# Patient Record
Sex: MALE | Race: BLACK OR AFRICAN AMERICAN | NOT HISPANIC OR LATINO | URBAN - METROPOLITAN AREA
[De-identification: names, ages, dates, MRNs, and addresses within clinical notes are randomized per-mention and may not be internally consistent; named-entity substitution may affect disease eponyms.]

---
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
## 2023-04-03 ENCOUNTER — RX RENEWAL (OUTPATIENT)
Age: 18
End: 2023-04-03

## 2023-04-03 RX ORDER — CHOLECALCIFEROL (VITAMIN D3) 50 MCG
25 MCG TABLET ORAL
Qty: 90 | Refills: 0 | Status: ACTIVE | COMMUNITY
Start: 2022-02-22 | End: 1900-01-01

## 2023-04-07 ENCOUNTER — APPOINTMENT (OUTPATIENT)
Dept: PEDIATRIC NEUROLOGY | Facility: CLINIC | Age: 18
End: 2023-04-07
Payer: COMMERCIAL

## 2023-04-07 VITALS
WEIGHT: 207 LBS | SYSTOLIC BLOOD PRESSURE: 134 MMHG | HEIGHT: 72 IN | BODY MASS INDEX: 28.04 KG/M2 | DIASTOLIC BLOOD PRESSURE: 76 MMHG

## 2023-04-07 PROCEDURE — 99214 OFFICE O/P EST MOD 30 MIN: CPT

## 2023-04-08 NOTE — QUALITY MEASURES
[Seizure frequency] : Seizure frequency: Yes [Etiology, seizure type, and epilepsy syndrome] : Etiology, seizure type, and epilepsy syndrome: Yes [Side effects of anti-seizure medications] : Side effects of anti-seizure medications: Yes [Safety and education around seizures] : Safety and education around seizures: Yes [Issues around driving] : Issues around driving: Not Applicable [Screening for anxiety, depression] : Screening for anxiety, depression: Yes [Treatment-resistant epilepsy (every visit)] : Treatment-resistant epilepsy (every visit): Not Applicable [Counseling for women of childbearing potential with epilepsy (including folic acid supplement)] : Counseling for women of childbearing potential with epilepsy (including folic acid supplement): Not Applicable [Adherence to medication(s)] : Adherence to medication(s): Yes [Options for adjunctive therapy (Neurostimulation, CBD, Dietary Therapy, Epilepsy Surgery)] : Options for adjunctive therapy (Neurostimulation, CBD, Dietary Therapy, Epilepsy Surgery): Not Applicable [25 Hydroxy Vitamin D level assessed and Vitamin D3 ordered] : 25 Hydroxy Vitamin D level assessed and Vitamin D3 ordered: Yes [Thyroid profile ordered] : Thyroid profile ordered: Not Applicable

## 2023-04-08 NOTE — ASSESSMENT
[FreeTextEntry1] : Isolated myoclonic seizures but no bilateral tonic clonic seizures for approaching two years. \par \par Seizure diagnosis, prognosis, recurrence risk,  provocative factors, health risks, first aid and safety precautions were reviewed. \par \par Indications for ongoing pharmacological treatment of seizures, potential benefits of treatment and possible adverse effects of medication were carefully considered and discussed.

## 2023-04-08 NOTE — HISTORY OF PRESENT ILLNESS
[FreeTextEntry1] : 17 year boy with generalized epilepsy. His electroclinical syndrome seems most consistent with ANDREW.\par \par He has not had any bilateral tonic clonic seizures for approaching two years since starting the zonisamide. He still has isolated myoclonic jerks the frequency of which is not entirely clear, weekly to monthly? These are not disruptive in any way.\par \par Attending college, studying biochemistry, doing well.\par \par Sleeping well.\par \par Depressed mood and excessive anxiety were denied.

## 2023-04-10 LAB
25(OH)D3 SERPL-MCNC: 12.2 NG/ML
ALBUMIN SERPL ELPH-MCNC: 4.9 G/DL
ALP BLD-CCNC: 119 U/L
ALT SERPL-CCNC: 34 U/L
ANION GAP SERPL CALC-SCNC: 12 MMOL/L
AST SERPL-CCNC: 19 U/L
BASOPHILS # BLD AUTO: 0.03 K/UL
BASOPHILS NFR BLD AUTO: 0.8 %
BILIRUB SERPL-MCNC: 0.5 MG/DL
BUN SERPL-MCNC: 8 MG/DL
CALCIUM SERPL-MCNC: 10.3 MG/DL
CHLORIDE SERPL-SCNC: 109 MMOL/L
CO2 SERPL-SCNC: 22 MMOL/L
CREAT SERPL-MCNC: 1.24 MG/DL
EGFR: 86 ML/MIN/1.73M2
EOSINOPHIL # BLD AUTO: 0.15 K/UL
EOSINOPHIL NFR BLD AUTO: 3.8 %
HCT VFR BLD CALC: 50.2 %
HGB BLD-MCNC: 16.8 G/DL
IMM GRANULOCYTES NFR BLD AUTO: 0 %
LYMPHOCYTES # BLD AUTO: 1.72 K/UL
LYMPHOCYTES NFR BLD AUTO: 44 %
MAN DIFF?: NORMAL
MCHC RBC-ENTMCNC: 30.7 PG
MCHC RBC-ENTMCNC: 33.5 GM/DL
MCV RBC AUTO: 91.6 FL
MONOCYTES # BLD AUTO: 0.28 K/UL
MONOCYTES NFR BLD AUTO: 7.2 %
NEUTROPHILS # BLD AUTO: 1.73 K/UL
NEUTROPHILS NFR BLD AUTO: 44.2 %
PLATELET # BLD AUTO: 283 K/UL
POTASSIUM SERPL-SCNC: 4.3 MMOL/L
PROT SERPL-MCNC: 7.2 G/DL
RBC # BLD: 5.48 M/UL
RBC # FLD: 12.7 %
SODIUM SERPL-SCNC: 143 MMOL/L
WBC # FLD AUTO: 3.91 K/UL

## 2023-04-21 LAB — ZONISAMIDE SERPL-MCNC: 13.4 UG/ML

## 2023-07-05 ENCOUNTER — RX RENEWAL (OUTPATIENT)
Age: 18
End: 2023-07-05

## 2023-07-05 ENCOUNTER — APPOINTMENT (OUTPATIENT)
Dept: PEDIATRIC NEUROLOGY | Facility: CLINIC | Age: 18
End: 2023-07-05
Payer: COMMERCIAL

## 2023-07-05 ENCOUNTER — APPOINTMENT (OUTPATIENT)
Dept: PEDIATRIC NEUROLOGY | Facility: CLINIC | Age: 18
End: 2023-07-05

## 2023-07-05 PROCEDURE — 95816 EEG AWAKE AND DROWSY: CPT

## 2023-07-05 RX ORDER — RESVER/WINE/BFL/GRPSD/PC/C/POM 200MG-60MG
25 MCG CAPSULE ORAL
Qty: 90 | Refills: 0 | Status: ACTIVE | COMMUNITY
Start: 2023-07-05 | End: 1900-01-01

## 2023-07-13 ENCOUNTER — APPOINTMENT (OUTPATIENT)
Dept: PEDIATRIC NEUROLOGY | Facility: CLINIC | Age: 18
End: 2023-07-13
Payer: COMMERCIAL

## 2023-07-13 VITALS — BODY MASS INDEX: 29.07 KG/M2 | HEIGHT: 72.44 IN | WEIGHT: 217 LBS

## 2023-07-13 PROCEDURE — 99214 OFFICE O/P EST MOD 30 MIN: CPT

## 2023-07-13 RX ORDER — ZONISAMIDE 50 MG/1
50 CAPSULE ORAL
Qty: 90 | Refills: 2 | Status: ACTIVE | COMMUNITY
Start: 2023-07-13 | End: 1900-01-01

## 2023-07-21 NOTE — HISTORY OF PRESENT ILLNESS
[FreeTextEntry1] : I have had the opportunity to see your patient, JOANN MARR, in follow up. \par \par Identification: 18 year old man \par \par Diagnosis(es): ANDREW\par \par Interval history: He had not had a GTC for 2 years on zonisamide. Myoclonic jerks are reported randomly perhaps a couple times per week. Concerns today including poor memory. He specifically notes poor memory for faces and word finding difficulty. I am pleased to report that he is still doing very well in college. Depressed mood and excessive anxiety were denied. Sleeping well. \par \par Paraclinical studies: EEG done in June - infrequent burst of atypical generalized spike-wave.

## 2023-07-21 NOTE — ASSESSMENT
[FreeTextEntry1] : It was my pleasure to have seen JOANN MARR in consultation. \par \par Identification: 18 year old man\par \par Impression: ANDREW\par \par Examination: No signs  of medication toxicity. \par \par Medical decision making: He still exhibits some myoclonic jerks but no GTC or observed absence seizures. It is possible that perceived memory impairment represents medication adverse effect (last ZNS level was only 13) versus subtle ongoing absence seizures versus neurocognitive deficits associated with ANDREW which are well documented in the medical literature. \par \par Discussion: Seizure diagnosis, prognosis, recurrence risk,  provocative factors, health risks, first aid and safety precautions were reviewed. Indications for ongoing pharmacological treatment of seizures, potential benefits of treatment and possible adverse effects of medication were carefully considered and discussed. \par \par Recommendations:\par - Increase zonisamide to 550 mg per day\par - AEEG\par - Referral for neuropsychological testing was instituted  to assess executive function, attention, memory, social cognition and visual spatial skills in order to define deficits, assess impact on activities of daily living and outline appropriate remediation strategies. \par

## 2023-07-21 NOTE — QUALITY MEASURES
[Seizure frequency] : Seizure frequency: Yes [Etiology, seizure type, and epilepsy syndrome] : Etiology, seizure type, and epilepsy syndrome: Yes [Side effects of anti-seizure medications] : Side effects of anti-seizure medications: Yes [Safety and education around seizures] : Safety and education around seizures: Yes [Sudden unexpected death in epilepsy (SUDEP)] : Sudden unexpected death in epilepsy: Yes [Issues around driving] : Issues around driving: Yes [Screening for anxiety, depression] : Screening for anxiety, depression: Yes [Treatment-resistant epilepsy (every visit)] : Treatment-resistant epilepsy (every visit): Yes [Adherence to medication(s)] : Adherence to medication(s): Yes [Options for adjunctive therapy (Neurostimulation, CBD, Dietary Therapy, Epilepsy Surgery)] : Options for adjunctive therapy (Neurostimulation, CBD, Dietary Therapy, Epilepsy Surgery): Yes [25 Hydroxy Vitamin D level assessed and Vitamin D3 ordered] : 25 Hydroxy Vitamin D level assessed and Vitamin D3 ordered: Yes [Counseling for women of childbearing potential with epilepsy (including folic acid supplement)] : Counseling for women of childbearing potential with epilepsy (including folic acid supplement): Not Applicable [Thyroid profile ordered] : Thyroid profile ordered: Not Applicable

## 2023-07-28 ENCOUNTER — APPOINTMENT (OUTPATIENT)
Dept: PEDIATRIC NEUROLOGY | Facility: HOSPITAL | Age: 18
End: 2023-07-28
Payer: COMMERCIAL

## 2023-07-28 ENCOUNTER — OUTPATIENT (OUTPATIENT)
Dept: OUTPATIENT SERVICES | Age: 18
LOS: 1 days | End: 2023-07-28

## 2023-07-28 DIAGNOSIS — G40.309 GENERALIZED IDIOPATHIC EPILEPSY AND EPILEPTIC SYNDROMES, NOT INTRACTABLE, W/OUT STATUS EPILEPTICUS: ICD-10-CM

## 2023-07-28 DIAGNOSIS — G40.309 GENERALIZED IDIOPATHIC EPILEPSY AND EPILEPTIC SYNDROMES, NOT INTRACTABLE, WITHOUT STATUS EPILEPTICUS: ICD-10-CM

## 2023-07-28 PROCEDURE — 95719 EEG PHYS/QHP EA INCR W/O VID: CPT

## 2023-07-31 PROBLEM — G40.309 GENERALIZED SEIZURE DISORDER: Status: ACTIVE | Noted: 2021-08-10

## 2023-10-11 NOTE — DATA REVIEWED
[facility] ([facility])  Brief Operative Note    Surgery Date: 10/11/2023     Surgeon(s) and Role:     * Braulio Hogan MD - Primary    Assisting Surgeon: None    Pre-op Diagnosis:  Achilles tendinosis of left lower extremity [M67.88]    Post-op Diagnosis:  Post-Op Diagnosis Codes:     * Achilles tendinosis of left lower extremity [M67.88]    Procedure(s) (LRB):  REPAIR, TENDON, ACHILLES (Left)    Anesthesia: Choice    Operative Findings: see OP note    Estimated Blood Loss: < 5 mL         Specimens: None    Discharge Note    OUTCOME: Patient tolerated treatment/procedure well without complication and is now ready for discharge.    DISPOSITION: Home or Self Care    FINAL DIAGNOSIS:  Achilles tendinosis of left lower extremity    FOLLOWUP: In clinic    DISCHARGE INSTRUCTIONS:    Discharge Procedure Orders   Diet general     Call MD for:  temperature >100.4     Call MD for:  persistent nausea and vomiting     Call MD for:  severe uncontrolled pain     Call MD for:  difficulty breathing, headache or visual disturbances     Call MD for:  redness, tenderness, or signs of infection (pain, swelling, redness, odor or green/yellow discharge around incision site)     Call MD for:  hives     Call MD for:  persistent dizziness or light-headedness     Call MD for:  extreme fatigue     Leave dressing on - Keep it clean, dry, and intact until clinic visit     Non weight bearing       
[de-identified] : Scoliosis x-rays AP and lateral were done today.  The curve measures 42°. Patient is Risser 4

## 2024-01-25 ENCOUNTER — RX RENEWAL (OUTPATIENT)
Age: 19
End: 2024-01-25

## 2024-05-17 ENCOUNTER — RX RENEWAL (OUTPATIENT)
Age: 19
End: 2024-05-17

## 2024-05-17 RX ORDER — ZONISAMIDE 100 MG/1
100 CAPSULE ORAL
Qty: 450 | Refills: 3 | Status: ACTIVE | COMMUNITY
Start: 2021-09-02 | End: 1900-01-01

## 2024-12-30 ENCOUNTER — APPOINTMENT (OUTPATIENT)
Dept: PEDIATRIC NEUROLOGY | Facility: CLINIC | Age: 19
End: 2024-12-30
Payer: COMMERCIAL

## 2024-12-30 ENCOUNTER — RX RENEWAL (OUTPATIENT)
Age: 19
End: 2024-12-30

## 2024-12-30 VITALS
BODY MASS INDEX: 33.5 KG/M2 | HEIGHT: 73.03 IN | SYSTOLIC BLOOD PRESSURE: 138 MMHG | DIASTOLIC BLOOD PRESSURE: 94 MMHG | WEIGHT: 252.8 LBS | HEART RATE: 81 BPM

## 2024-12-30 DIAGNOSIS — G40.409 OTHER GENERALIZED EPILEPSY AND EPILEPTIC SYNDROMES, NOT INTRACTABLE, W/OUT STATUS EPILEPTICUS: ICD-10-CM

## 2024-12-30 PROCEDURE — 99214 OFFICE O/P EST MOD 30 MIN: CPT

## 2024-12-31 LAB
25(OH)D3 SERPL-MCNC: 6.7 NG/ML
ALBUMIN SERPL ELPH-MCNC: 4.9 G/DL
ALP BLD-CCNC: 110 U/L
ALT SERPL-CCNC: 45 U/L
ANION GAP SERPL CALC-SCNC: 16 MMOL/L
AST SERPL-CCNC: 30 U/L
BASOPHILS # BLD AUTO: 0.03 K/UL
BASOPHILS NFR BLD AUTO: 0.7 %
BILIRUB SERPL-MCNC: 0.6 MG/DL
BUN SERPL-MCNC: 11 MG/DL
CALCIUM SERPL-MCNC: 9.9 MG/DL
CHLORIDE SERPL-SCNC: 108 MMOL/L
CO2 SERPL-SCNC: 20 MMOL/L
CREAT SERPL-MCNC: 1.45 MG/DL
EGFR: 70 ML/MIN/1.73M2
EOSINOPHIL # BLD AUTO: 0.15 K/UL
EOSINOPHIL NFR BLD AUTO: 3.3 %
GLUCOSE SERPL-MCNC: 115 MG/DL
HCT VFR BLD CALC: 52.9 %
HGB BLD-MCNC: 18.2 G/DL
IMM GRANULOCYTES NFR BLD AUTO: 0 %
LYMPHOCYTES # BLD AUTO: 1.82 K/UL
LYMPHOCYTES NFR BLD AUTO: 40.2 %
MAN DIFF?: NORMAL
MCHC RBC-ENTMCNC: 30.8 PG
MCHC RBC-ENTMCNC: 34.4 G/DL
MCV RBC AUTO: 89.5 FL
MONOCYTES # BLD AUTO: 0.71 K/UL
MONOCYTES NFR BLD AUTO: 15.7 %
NEUTROPHILS # BLD AUTO: 1.82 K/UL
NEUTROPHILS NFR BLD AUTO: 40.1 %
PLATELET # BLD AUTO: 141 K/UL
POTASSIUM SERPL-SCNC: 4.4 MMOL/L
PROT SERPL-MCNC: 7.9 G/DL
RBC # BLD: 5.91 M/UL
RBC # FLD: 13.2 %
SODIUM SERPL-SCNC: 144 MMOL/L
WBC # FLD AUTO: 4.53 K/UL

## 2025-01-03 LAB
LAMOTRIGINE SERPL-MCNC: <1 UG/ML
ZONISAMIDE SERPL-MCNC: 19.9 UG/ML

## 2025-01-07 NOTE — ED PEDIATRIC TRIAGE NOTE - NS ED NURSE DIRECT TO ROOM YN
Physical Therapy  Cash Based Dry Needling Session    Visit: 44  Dry Needling Informed Consent Signed: Yes  Patient has been made aware of the cash based cost associated with each of the above procedures: Yes    SUBJECTIVE   Current Pain: not rated, just a little sore  Some days are good, some are bad.     OBJECTIVE    Palpation:  Tenderness and restrictions right gluteals and piriformis     Treatment   Education about indications, contraindications and potential side effects completed with patient.    Screen Completed    - Precautions: local skin lesions, lyme disease, local lymphedema, severe hyperalgesia/allodynia, metal allergies: nickel and chromium, abnormal bleeding tendency, immunodeficiency and/or compromised immune system, second or third trimester of pregnancy, vascular disease, history of spontaneous pneumothorax   - Contraindications: local or systemic infections including the flu, over implants, active cancer, area of lymphatic compromise, area of lumpectomy/mastectomy, first trimester of pregnancy    Patient Education  Review of last treatment, symptoms following treatment, and changes noted   Review dry needling expectations of treatment and rationale of use     Reinforced avoiding exercise for the rest of the day and vigorous movement or lifting to minimize soreness potential.      Universal Protocol  - Time Out performed with patient verifying procedure and consent prior to performing the procedure.  - Sign Out performed with patient verifying procedure performed and addressing specimens if applicable upon completion of the procedure.        Dry Needling:  Needles Inserted: 9     Needles Removed: 9    Locations and Needle Sizes:  right   1. Glut med middle: 60 mm  2. Glut med posterior: 60 mm  3. Glut med anterior: 60 mm  4  Glut min anterior 60 mm  5. Glut min posterior: 60 mm  6. Glut min middle: 60 mm  7-8. Piriformis: 60 mm  9. Glut Med middle: 60 mm    Advised patient to monitor symptoms as day  progresses, and to pay attention to changes in function or pain patterns.  Advised ok to apply heat/ice as necessary to treat any soreness present in area.      ASSESSMENT AND FUTURE RECOMMENDATIONS    Response to treatment: Multiple twitches and palpable improvement in restrictions noted.  Mild soreness, but improved ease of movement   Re-assessment of dysfunctions following intervention demonstrates: improved ease of mobility     Based on the above recommendation is to: Continue Cash Based Service      Therapy procedure time and total treatment time can be found documented on the Time Entry flowsheet   Yes

## 2025-01-22 ENCOUNTER — APPOINTMENT (OUTPATIENT)
Dept: PEDIATRIC NEUROLOGY | Facility: CLINIC | Age: 20
End: 2025-01-22

## 2025-06-09 ENCOUNTER — RX RENEWAL (OUTPATIENT)
Age: 20
End: 2025-06-09

## 2025-08-07 ENCOUNTER — RX RENEWAL (OUTPATIENT)
Age: 20
End: 2025-08-07